# Patient Record
Sex: FEMALE
[De-identification: names, ages, dates, MRNs, and addresses within clinical notes are randomized per-mention and may not be internally consistent; named-entity substitution may affect disease eponyms.]

---

## 2018-08-14 ENCOUNTER — HOSPITAL ENCOUNTER (EMERGENCY)
Dept: HOSPITAL 14 - H.ER | Age: 28
Discharge: HOME | End: 2018-08-14
Payer: SELF-PAY

## 2018-08-14 VITALS
TEMPERATURE: 98.7 F | SYSTOLIC BLOOD PRESSURE: 107 MMHG | DIASTOLIC BLOOD PRESSURE: 66 MMHG | OXYGEN SATURATION: 99 % | HEART RATE: 87 BPM | RESPIRATION RATE: 16 BRPM

## 2018-08-14 DIAGNOSIS — L50.0: ICD-10-CM

## 2018-08-14 DIAGNOSIS — T78.40XA: Primary | ICD-10-CM

## 2018-08-14 DIAGNOSIS — L29.9: ICD-10-CM

## 2018-08-14 NOTE — ED PDOC
HPI: Allergic Reaction


Time Seen by Provider: 08/14/18 19:52


Chief Complaint (Nursing): Allergic Reaction


Chief Complaint (Provider): Diffuse Hives


History Per: Patient


History/Exam Limitations: no limitations


Onset/Duration Of Symptoms: Hrs


Current Symptoms Are (Timing): Still Present


Possible Cause: Unknown


Associated Symptoms: Itching


Home/EMS Treatment: None


Additional Complaint(s): 


27 year old female presents to the emergency department to be evaluated for 

diffuse hives. Patient reports that todaywhile at work she began to sneeze and 

feel a bit congested and then sometime later she realized the left side of her 

face began to get swollen. She further states that en route to the ED her body 

began to get itchy and feel very hot. denies exposure to new food, soaps, 

lotions. Patient states she not had a similar reaction in the past. Denies 

throat swelling, shortness of breath, chest pain, difficulty breathing, lip 

swelling.








Past Medical History


Reviewed: Historical Data, Nursing Documentation, Vital Signs


Vital Signs: 


 Last Vital Signs











Temp  98.7 F   08/14/18 19:47


 


Pulse  87   08/14/18 19:47


 


Resp  16   08/14/18 19:47


 


BP  107/66   08/14/18 19:47


 


Pulse Ox  99   08/14/18 19:47














- Medical History


PMH: No Chronic Diseases





- Family History


Family History: States: Unknown Family Hx





- Social History


Current smoker - smoking cessation education provided: No


Alcohol: Social


Drugs: Other (marijuana)





- Home Medications


Home Medications: 


 Ambulatory Orders











 Medication  Instructions  Recorded


 


Azithromycin [Zithromax] 250 mg PO DAILY #6 tab 03/24/17


 


DiphenhydrAMINE [Benadryl] 50 mg PO Q6 PRN #30 cap 08/14/18


 


Famotidine [Pepcid] 40 mg PO DAILY #5 tab 08/14/18


 


predniSONE [predniSONE Tab] 40 mg PO DAILY #8 tab 08/14/18














- Allergies


Allergies/Adverse Reactions: 


 Allergies











Allergy/AdvReac Type Severity Reaction Status Date / Time


 


spice Allergy  SWELLING Uncoded 03/24/17 16:57


 


spicy food Allergy  SWELLING Uncoded 08/14/18 19:46














Review of Systems


Constitutional: Negative for: Fever


ENT: Negative for: Mouth Swelling, Throat Swelling


Cardiovascular: Positive for: Chest Pain


Respiratory: Negative for: Shortness of Breath


Skin: Positive for: Other (diffuse hives)





Physical Exam





- Reviewed


Nursing Documentation Reviewed: Yes


Vital Signs Reviewed: Yes





- Physical Exam


Comments: 


GENERAL APPEARANCE: Patient is awake, alert, oriented x 3, in no acute distress

, (-) facial swelling.


SKIN:  Diffuse Hives. Otherwise (-) excoriations, (-) drainage, (-) crusting of 

lesions is present. 


HENT: (-) conjunctival injection, (-) chemosis. Oropharynx: clear (-) tongue or 

lip swelling, (-) tonsillar exudates, (-) erythema. Airway: patent (-) stridor, 

(-) hoarseness.  Mucous membranes moist. Nares: Patent (-) rhinorrhea.


NECK: (-) lymphadenopathy, (-) tenderness. 


CARDIOVASCULAR: Normal rate and rhythm.  (-) murmur, (-) gallop. 


CHEST: (-) rales, (-) wheezing, (-) dyspnea, (-) stridor. Breath sounds equal 

bilaterally.


ABDOMEN: Soft. (-) tenderness, (-) distention, (-) HSM. 


NEURO: Mental status: Patient is alert, oriented, and with normal strength and 

tone.








- ECG


O2 Sat by Pulse Oximetry: 99 (RA)


Pulse Ox Interpretation: Normal





- Progress


ED Course And Treament: 


2020


Initial Impression


27 year old female presenting with allergic reaction


Initial plan:


* Benadryl 50 mg PO


* Pepcid 40 mg PO


* Prednisone 60 mg PO


* Reevaluation








2100


On re-evaluation, patient reports improvement of symptoms. On exam, patient 

remains AAOx3, in no acute distress. On exam, neck is supple, lungs CTA, 

cardiac RRR, abdomen is soft and non-tender, neuro exam shows no focal 

findings. VSS, stable for discharge. 


Diagnostic results d/w the patient in great detail. Dx of allergic reaction, 

hives, pruritis d/w the patient. 


Based on history, exam and diagnostic results plan will be for discharge and 

outpatient follow up.


Advised to follow up with primary care physician in 1-2 days without fail. 

Advised to take medication as prescribed. Return to the emergency room at any 

time for any new or worsening symptoms.


Patient states she fully agrees with and understands discharge instructions. 

States that she agrees with the plan and disposition. Verbalized and repeated 

discharge instructions and plan. I have given the patient opportunity to ask 

any additional questions.


--------------------------------------------------------------------------------

------


Documented by andrew Gibson acting as a scribe for Criselda Amaya PA-C. 





All medical record entries made by the Scribe were at my direction and 

personally dictated by me. I have reviewed the chart and agree that the record 

accurately reflects my personal performance of the history, physical exam, 

medical decision making, and the department course for this patient. I have 

also personally directed, reviewed, and agree with the discharge instructions 

and disposition.

















Disposition





- Clinical Impression


Clinical Impression: 


 Allergic reaction, Hives, Generalized pruritus








- Patient ED Disposition


Is Patient to be Admitted: No


Counseled Patient/Family Regarding: Studies Performed, Diagnosis, Need For 

Followup, Rx Given





- Disposition


Referrals: 


Perlman,Donald B, MD [Staff Provider] - 


Disposition: Routine/Home


Disposition Time: 21:04


Condition: STABLE


Additional Instructions: 


The emergency medical care you received today was directed at your acute 

symptoms. If you were prescribed any medication, please fill it and take as 

directed. It may take several days for your symptoms to resolve. Return to the 

Emergency Department if your symptoms worsen, do not improve, or if you have 

any other problems.





Please contact your doctor in 2 days for re-evaluation and follow up / or call 

one of the physicians/clinics you have been referred to that are listed on the 

Patient Visit Information form that is included in your discharge packet. Bring 

any paperwork you were given at discharge with you along with any medications 

you are taking to your follow up visit. Our treatment cannot replace ongoing 

medical care by a primary care provider (PCP) outside of the emergency 

department.


Prescriptions: 


DiphenhydrAMINE [Benadryl] 50 mg PO Q6 PRN #30 cap


 PRN Reason: Allergy Symptoms


Famotidine [Pepcid] 40 mg PO DAILY #5 tab


predniSONE [predniSONE Tab] 40 mg PO DAILY #8 tab


Instructions:  Hives, Itchy Skin, Allergy Skin Testing


Forms:  [employer] (English)


Print Language: ENGLISH





- POA


Present On Arrival: None